# Patient Record
Sex: MALE | Race: BLACK OR AFRICAN AMERICAN | Employment: OTHER | URBAN - METROPOLITAN AREA
[De-identification: names, ages, dates, MRNs, and addresses within clinical notes are randomized per-mention and may not be internally consistent; named-entity substitution may affect disease eponyms.]

---

## 2023-01-02 ENCOUNTER — APPOINTMENT (OUTPATIENT)
Dept: GENERAL RADIOLOGY | Age: 68
End: 2023-01-02
Payer: MEDICARE

## 2023-01-02 ENCOUNTER — HOSPITAL ENCOUNTER (EMERGENCY)
Age: 68
Discharge: HOME | End: 2023-01-02
Attending: EMERGENCY MEDICINE
Payer: MEDICARE

## 2023-01-02 VITALS
SYSTOLIC BLOOD PRESSURE: 146 MMHG | HEART RATE: 87 BPM | RESPIRATION RATE: 16 BRPM | DIASTOLIC BLOOD PRESSURE: 78 MMHG | OXYGEN SATURATION: 97 %

## 2023-01-02 DIAGNOSIS — E11.621 DIABETIC ULCER OF RIGHT FOOT ASSOCIATED WITH TYPE 2 DIABETES MELLITUS, LIMITED TO BREAKDOWN OF SKIN, UNSPECIFIED PART OF FOOT (HCC): Primary | ICD-10-CM

## 2023-01-02 DIAGNOSIS — L97.511 DIABETIC ULCER OF RIGHT FOOT ASSOCIATED WITH TYPE 2 DIABETES MELLITUS, LIMITED TO BREAKDOWN OF SKIN, UNSPECIFIED PART OF FOOT (HCC): Primary | ICD-10-CM

## 2023-01-02 PROCEDURE — 73630 X-RAY EXAM OF FOOT: CPT

## 2023-01-02 PROCEDURE — 99283 EMERGENCY DEPT VISIT LOW MDM: CPT

## 2023-01-02 RX ORDER — DOXYCYCLINE HYCLATE 100 MG
100 TABLET ORAL 2 TIMES DAILY
Qty: 14 TABLET | Refills: 0 | Status: SHIPPED | OUTPATIENT
Start: 2023-01-02 | End: 2023-01-09

## 2023-01-02 RX ORDER — CEPHALEXIN 500 MG/1
500 CAPSULE ORAL 4 TIMES DAILY
Qty: 28 CAPSULE | Refills: 0 | Status: SHIPPED | OUTPATIENT
Start: 2023-01-02 | End: 2023-01-09

## 2023-01-02 ASSESSMENT — PAIN DESCRIPTION - ORIENTATION: ORIENTATION: RIGHT

## 2023-01-02 ASSESSMENT — PAIN DESCRIPTION - LOCATION: LOCATION: FOOT

## 2023-01-02 ASSESSMENT — PAIN DESCRIPTION - DESCRIPTORS: DESCRIPTORS: ACHING

## 2023-01-02 ASSESSMENT — PAIN SCALES - GENERAL: PAINLEVEL_OUTOF10: 7

## 2023-01-02 ASSESSMENT — PAIN DESCRIPTION - PAIN TYPE: TYPE: ACUTE PAIN

## 2023-01-02 NOTE — ED PROVIDER NOTES
[address] [address] ENCOUNTER        Patient Name: Ara Campoverde  MRN: [id number]  [place] 1955  Date of evaluation: 1/2/2023  Provider: Klaus Ingram MD  PCP: No primary care provider on file. Note Started: 10:57 AM EST 1/2/23    CHIEF COMPLAINT       Foot Pain      HISTORY OF PRESENT ILLNESS: 1 or more Elements     History from : Patient    Limitations to history : None    Ara Campoverde is a 75 y.o. male who presents for evaluation of right foot pain. He has history of type 2 diabetes. He states that he noted pain to the right foot several days ago. He only noticed the development of a ulcer at the base of the first metatarsal yesterday. He is noting some pain in the arch of the foot. Denies any fevers nausea or vomiting. Denies any redness that is going up the foot or the leg. He has not recently been on antibiotics. Nursing Notes were all reviewed and agreed with or any disagreements were addressed in the HPI. REVIEW OF SYSTEMS :      Review of Systems    Positives and Pertinent negatives as per HPI. SURGICAL HISTORY   No past surgical history on file. CURRENTMEDICATIONS       Previous Medications    No medications on file       ALLERGIES     Patient has no known allergies. FAMILYHISTORY     No family history on file. SOCIAL HISTORY       Social History     Vaping Use    Vaping Use: Never used   Substance Use Topics    Alcohol use: Yes     Comment: social    Drug use: Yes     Types: Marijuana (Weed)     Comment: weekly       SCREENINGS        [place] Coma Scale  Eye Opening: Spontaneous  Best Verbal Response: Oriented  Best Motor Response: Obeys commands  [place] Coma Scale Score: 15                CIWA Assessment  BP: (!) 146/78  Heart Rate: 87           PHYSICAL EXAM  1 or more Elements     ED Triage Vitals   BP Temp Temp src Pulse Resp SpO2 Height Weight   -- -- -- -- -- -- -- --       General: No acute distress.  Alert and Oriented. Appears stated age. HEENT: No midline cervical spine tenderness. Full ROM of the neck. Cardiac: Regular rate and rhythm. Radial pulses are intact bilaterally. Chest: No respiratory distress. No increased work of breathing. No use of accessory muscles for respiration. Extremities:No significant lower extremity edema. Lower extremities are symmetric. There is diabetic foot wound to the base of the first metatarsal, there is skin breakdown. No bone exposure. There is no purulent drainage. There is very mild erythema surrounding the ulceration. There is no crepitance. There is no erythema of the dorsum of the foot or the leg. Neuro: Moving all extremities. No focal deficits. Speech is clear. Skin:No rash, no erythema  Psych: Calm and cooperative. DIAGNOSTIC RESULTS   LABS:    Labs Reviewed - No data to display    When ordered only abnormal lab results are displayed. All other labs were within normal range or not returned as of this dictation. RADIOLOGY:   Non-plain film images such as CT, Ultrasound and MRI are read by the radiologist. Plain radiographic images are visualized and preliminarily interpreted by the ED Provider with the below findings: There is no subcutaneous air or obvious fracture    Interpretation per the Radiologist below, if available at the time of this note:    XR FOOT RIGHT (MIN 3 VIEWS)   Final Result   Impression:   1. No acute fracture or dislocation. 2.  No osseous erosions are identified. 3.  Degenerative changes are present in the first metatarsophalangeal joint. No results found. Bedside Ultrasound, as interpreted by me, if performed:    No results found.     PROCEDURES     Unless otherwise noted below, none     Procedures    CRITICAL CARE TIME     I personally spent a total of 0 minutes of critical care time in obtaining history, performing a physical exam, bedside monitoring of interventions, collecting and interpreting tests and discussion with consultants but excluding time spent performing procedures, treating other patients and teaching time. PAST MEDICAL HISTORY      has a past medical history of Diabetes mellitus ([address] Hyperlipidemia, and Hypertension. EMERGENCY DEPARTMENT COURSE and DIFFERENTIAL DIAGNOSIS/MDM:     Vitals:    Vitals:    01/02/23 1106   BP: (!) 146/78   Pulse: 87   Resp: 16   SpO2: 97%       Patient was treated with and given the following medications:  Medications - No data to display          Is this patient to be included in the SEP-1 Core Measure due to severe sepsis or septic shock? No   Exclusion criteria - the patient is NOT to be included for SEP-1 Core Measure due to:  2+ SIRS criteria are not met    CC/HPI Summary, DDx, ED Course, and Reassessment:     77-year-old male with history of type 2 diabetes presenting for pain in the right foot, found to have diabetic foot ulcer, there is mild cellulitic change around this however not extending to the foot or the leg, he has stable vital signs we will obtain x-ray of the foot to evaluate for potential osteomyelitis. The differential diagnosis associated with the patient's presentation includes: Osteomyelitis, cellulitis, diabetic foot infection, tendinitis    CONSULTS: (Who and What was discussed)  None    Discussion with Other Professionals : None      Social Determinants : None    Patient's care impacted by chronic condition(s): Diabetes, chronic heart failure, hypertension    Records Reviewed :  Outpatient Notes family medicine office visit on 10/25/2022    Family medicine note revealed management of diabetes    Disposition Considerations (include 1 Tests not done, Shared Decision Making, Pt Expectation of Test or Tx.):     I considered performing laboratory evaluation but did not after shared decision making with the patient as he has had stable vital signs, is afebrile, appears systemically well, no signs of osteomyelitis on x-ray of the foot        Appropriate for outpatient management due to nontoxic appearance, stable vital signs, no findings of osteomyelitis on x-ray of the foot, he also has established follow-up with podiatry that he will call today for an appointment    Escalation of care including admission/observation considered due to diabetic foot infection, but patient was able to be discharged due to good follow-up plan, stable vital signs. Shared decision making with patient was employed. He was advised that if he does develop fever, worsening redness of the foot or leg or develop any other concerning symptoms to come back for IV antibiotics, suspected admission. The patient will be discharged from the emergency department. The patient was counseled on their diagnosis and any medications prescribed. They were advised on the need for PCP followup. They were counseled on the need to return to the emergency department if any of their symptoms were to worsen, change or have any other concerns. Discharged in stable condition. I am the Primary Clinician of Record. FINAL IMPRESSION      1. Diabetic ulcer of right foot associated with type 2 diabetes mellitus, limited to breakdown of skin, unspecified part of foot [facility])          DISPOSITION/PLAN     DISPOSITION Decision To Discharge 01/02/2023 12:05:05 PM      PATIENT REFERRED TO:  [address] Emergency Department  [address] [place] [address] [id number] [place] [address]  [address]  [phone]    Schedule an appointment as soon as possible for a visit       DISCHARGE MEDICATIONS:  Patient was given scripts for the following medications.  I counseled patient how to take these medications:  New Prescriptions    CEPHALEXIN (KEFLEX) 500 MG CAPSULE    Take 1 capsule by mouth 4 times daily for 7 days    DOXYCYCLINE HYCLATE (VIBRA-TABS) 100 MG TABLET    Take 1 tablet by mouth 2 times daily for 7 days       DISCONTINUED MEDICATIONS:  Discontinued Medications    No medications on file              (This chart was generated in part by using Dragon Dictation system and may contain errors related to that system including errors in grammar, punctuation, and spelling, as well as words and phrases that may be inappropriate.  If there are any questions or concerns please feel free to contact the dictating provider for clarification.)    MD Adriana Christensen MD  01/02/23 8081

## 2023-01-02 NOTE — DISCHARGE INSTRUCTIONS
You were x-ray did not show signs of bone infection. You likely have a diabetic foot ulcer. You have been started on antibiotics. Please take the antibiotics as directed, call your podiatrist today to schedule appointment for a visit as soon as possible.   If you develop any fever, nausea, vomiting, increased redness or drainage, please come back to the emergency department as you would need IV antibiotics and admitted to the hospital.

## 2023-01-02 NOTE — ED NOTES
Patient given d/c instructions with return verbalization including Rx. Emphasis on f/u and om completion of antibiotics. Reviewed wound care, dressing change and s/s of infection. Pt aware that he can loose foot if does not get appropriate f/u. To speak to PCP about diabetic ed classes. To call Dr in AM. To return with worsening s/s. Declined use of WC, Ambulated to lobby with steady gait.      Carlene Humphreys RN  01/02/23 0604

## 2023-01-06 ENCOUNTER — OFFICE VISIT (OUTPATIENT)
Dept: INTERNAL MEDICINE CLINIC | Age: 68
End: 2023-01-06
Payer: MEDICARE

## 2023-01-06 VITALS — TEMPERATURE: 98 F

## 2023-01-06 DIAGNOSIS — B35.1 ONYCHOMYCOSIS: ICD-10-CM

## 2023-01-06 DIAGNOSIS — L97.512 FOOT ULCERATION, RIGHT, WITH FAT LAYER EXPOSED (HCC): Primary | ICD-10-CM

## 2023-01-06 DIAGNOSIS — Z86.39 HISTORY OF UNCONTROLLED DIABETES: ICD-10-CM

## 2023-01-06 DIAGNOSIS — L97.521 FOOT ULCERATION, LEFT, LIMITED TO BREAKDOWN OF SKIN (HCC): ICD-10-CM

## 2023-01-06 PROCEDURE — 11042 DBRDMT SUBQ TIS 1ST 20SQCM/<: CPT

## 2023-01-06 PROCEDURE — 11721 DEBRIDE NAIL 6 OR MORE: CPT

## 2023-01-06 PROCEDURE — 99213 OFFICE O/P EST LOW 20 MIN: CPT

## 2023-01-09 ENCOUNTER — TELEPHONE (OUTPATIENT)
Dept: INTERNAL MEDICINE CLINIC | Age: 68
End: 2023-01-09

## 2023-01-09 NOTE — TELEPHONE ENCOUNTER
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 Dr. Stephen Lopez states she will see him again on Friday . Depending on what the wounds look like at that time , she will decide when he can return to work . Patient notified . Agrees's with the  plan .

## 2023-01-09 NOTE — TELEPHONE ENCOUNTER
PT STATED HE NEED A DR'S NOTE STATING HE HAD [facility] AND WHEN HE CAN START WORKING. PT WANTS IT FAXED [phone].  PT CAN BE REACHED [phone]

## 2023-01-09 NOTE — PROGRESS NOTES
Department of Podiatry  Resident Progress Note    Harmon Bias  Allergies: Patient has no known allergies. SUBJECTIVE  The patient is a 75 y.o. male who presents with chief complaint of right lower extremity ulceration. Patient recently presented to the [facility] emergency department on 1/2/2023. At this time x-rays were performed and determination that there is no osteomyelitis it was felt states that the patient could follow-up as an outpatient at the [facility] podiatric clinic. Patient relates that he has not had ulcerations in the past and that he only just noticed this wound. Patient denies any drainage from the ulceration but did notice occasional bleeding in his socks which prompted him to look at the bottom of his feet. Patient relates that he does have loss of sensation to his feet including numbness, tingling, burning. Patient states that he does not regularly check his blood sugar and they his most recent hemoglobin A1c was 13. Patient denies taking medication for diabetes. Patient denies any nausea, vomiting, fever, chills. Patient also relates elongated difficult to cut toenails but denies other pedal complaints at this time. Past Medical History:        Diagnosis Date    Diabetes mellitus ([facility] [address]     Hyperlipidemia     Hypertension        REVIEW OF SYSTEMS:  CONSTITUTIONAL:  negative    OBJECTIVE  Patient presents ambulating in athletic shoe gear with a female . Patient is unassisted, ANO x3 and in no acute distress. VASCULAR: DP and PT pulses are faintly palpable 1/4 b/l. CFT is brisk to the digits of the foot b/l. Skin temperature is warm to cool from proximal to distal with no focal calor noted. No edema noted. No pain with calf compression b/l. NEUROLOGIC: Gross and epicritic sensation is absent b/l. Protective absent is appreciated at all pedal sites b/l. DERMATOLOGIC: Nails 1-5 b/l are elongated and thickened.  Webspaces 1-4 b/l are clean, dry, and intact. No subcutaneous nodules, rashes, or other skin lesions noted. Right lower extremity:  Full-thickness ulceration noted to the subfirst metatarsal head right lower extremity. The wound base is a mix of fibrotic and granular tissue. The periwound is macerated. The wound does undermine prior to debridement however no probe to bone, tunneling, tracking present. There is no fluctuance, crepitus or purulence noted. There is slight malodor appreciated. Left lower extremity:  Predebridement hyperkeratotic tissue noted subfirst metatarsal head. Following debridement of hyperkeratotic tissue underlying partial-thickness ulceration noted with serous fluid present within the lesion. Malodor noted however no crepitus, fluctuance. The wound does not tunnel, track, probe to bone. MUSCULOSKELETAL: Muscle strength is 5/5 for all pedal groups tested. No pain with palpation of the foot or ankle b/l. Ankle joint ROM is decreased in dorsiflexion with the knee extended. HAV deformity noted right lower extremity. No previous amputations appreciated. IMAGING  Narrative   Exam: XR FOOT RIGHT (MIN 3 VIEWS)       History: diabetic ulcer, base of 1st metatarsal, eval for osteo       Comparison: None       Findings:   Bones: There is no acute fracture or dislocation. No osseous erosion or destruction is seen. Joint spaces: The joint spaces are well maintained. A calcaneal spur is present. Degenerative changes are present the first metatarsophalangeal joint. Soft tissues: There is no soft tissue swelling. No radiopaque foreign body is seen. Impression   Impression:   1. No acute fracture or dislocation. 2.  No osseous erosions are identified. 3.  Degenerative changes are present in the first metatarsophalangeal joint. ASSESSMENT  1. Full-thickness ulceration subfirst met head right lower extremity ([place] 2)  2.   Partial-thickness ulceration subfirst metatarsal head left lower extremity ([place] 1)  3. Uncontrolled diabetes mellitus type 2 with peripheral neuropathy  4. Onychomycosis nails 1 through 5 B/L    PLAN  -Evaluation and management x 15 minutes and greater than 50% of the time spent explaining the etiology and treatment with the patient. --Patient educated about the importance of diabetic foot care consisting of but not limited to daily foot inspections, wearing supportive shoes and inserts at all times, applying lotion to feet while sparing webspaces, and routinely checking blood sugar.   -Verbal consent was obtained prior to debridement. Utilizing a #15 blade the ulceration was excisionally divided down to and including subcutaneous tissue on the right and down to and including dermal tissue on the left lower extremity. Approximately 1 cc of bleeding appreciated. Hemostasis was achieved with direct compression.  -Verbal consent obtained prior to debridement of nails. Utilizing sterile nail nippers nails 1 through 5 bilaterally were debrided in thickness and length. Patient tolerated procedure well without incident.  -Patient to continue with outpatient antibiotics as prescribed by the emergency department through course completion.  -Bilateral lower extremities were dressed with Betadine, gauze, Kerlix, Ace  -A walking boot was dispensed to the patient right lower extremity and a surgical shoe was dispensed to be worn to the patient's left lower extremity. Patient was instructed to wear both of these assistive devices at all times to offload the bilateral lower extremity.  -Instructed patient to watch for signs and symptoms of infection including but not limited to fever, chills, feeling ill, redness around the wounds, redness streaking up the legs, purulent drainage.  Instructed patient to call the clinic or present to the nearest emergency department if they notice these signs or symptoms  -Surgical options were briefly discussed with the patient as the patient's foot structure consisting of HAV is likely contributing to the excessive force underneath the subfirst metatarsal head and subsequent soft tissue destruction. However related to the patient that with an A1c of 13 that surgery would be an appropriate option at this time due to decreased wound healing and increased surgical complications. Related to patient that he needs to work on glucose control and establish care with his primary care physician immediately for discussion on diabetic medication and management.  -Referral sent to home health care to assist with wound care dressings. Patient was instructed to leave the dressing clean, dry, intact until home health care can be established or next appointment, which ever is to come first.    -Patient to return to clinic in 1 week for further wound evaluation bilateral lower extremity.     Patient assessment and plan discussed with Dr. Resendiz Number, [phone] Breanne Chen DPM  01/08/23  8:25 PM

## 2023-01-13 ENCOUNTER — OFFICE VISIT (OUTPATIENT)
Dept: INTERNAL MEDICINE CLINIC | Age: 68
End: 2023-01-13
Payer: MEDICARE

## 2023-01-13 VITALS — TEMPERATURE: 97 F

## 2023-01-13 DIAGNOSIS — L97.512 FOOT ULCERATION, RIGHT, WITH FAT LAYER EXPOSED (HCC): ICD-10-CM

## 2023-01-13 DIAGNOSIS — L97.522 FOOT ULCERATION, LEFT, WITH FAT LAYER EXPOSED (HCC): ICD-10-CM

## 2023-01-13 DIAGNOSIS — Z86.39 HISTORY OF UNCONTROLLED DIABETES: Primary | ICD-10-CM

## 2023-01-13 PROCEDURE — 99213 OFFICE O/P EST LOW 20 MIN: CPT | Performed by: STUDENT IN AN ORGANIZED HEALTH CARE EDUCATION/TRAINING PROGRAM

## 2023-01-15 NOTE — PROGRESS NOTES
Department of Podiatry  Resident Progress Note    Tonya Tavares  Allergies: Patient has no known allergies. SUBJECTIVE  The patient is a 75 y.o. male who presents today for a wound check. Patient says that home health care was able to get set up. Patient says that they came for the initial visit, and are supposed to start the dressing changes next week. Patient states that he has noticed some more drainage from the ulceration on his right foot but denies any increase in pain. Patient additionally states that he did not check his blood sugar today. Patient denies any other pedal complaints. Patient denies any N/V/F/C/SoB. Past Medical History:        Diagnosis Date    Diabetes mellitus ([facility] [address]     Hyperlipidemia     Hypertension        REVIEW OF SYSTEMS:    Review of Systems: Pertinent positive and negative findings as documented in the HPI, otherwise all other systems were reviewed and were negative. OBJECTIVE  Patient presents to clinic today unassisted and ambulating in athletic shoe gear. Patient is AAOx3 and NAD. VASCULAR: DP and PT pulses are faintly palpable +1/4 b/l. CFT is brisk to the digits of the foot b/l. Skin temperature is warm to cool from proximal to distal with no focal calor noted. No edema or erythema noted. No pain with calf compression b/l. NEUROLOGIC: Gross and epicritic sensation is absent b/l. Protective sensation is absent at all pedal sites b/l. DERMATOLOGIC: Chronic dermatologic changes noted to b/l LE. Nails 1-5 b/l are WNL for thickness and length. Webspaces 1-4 b/l are clean, dry, and intact. Right lower extremity has a full-thickness ulceration subfirst metatarsal head that measures 1.0 cm x 0.7 cm x 0.3 cm with a fibrogranular wound bed and an extensive hyperkeratotic periwound. No purulence, crepitus or fluctuance noted. Upon debridement of the hyperkeratotic periwound revealed a macerated dermal tissue layer with slight malodor noted.     Patient provided verbal consent for pictures obtained today:          Left lower extremity has hyperkeratosis noted sub 1st and 5th metatarsal head. Upon debridement of the hyperkeratotic tissue revealed a full-thickness ulceration sub 1st metatarsal head and a healthy dermal tissue layer under the 5th metatarsal head. The full thickness ulceration sub 1st metatarsal head measured 0.5 cm x 0.3 cm x 0.2 cm with a fibrogranular wound bed and a macerated dermal periwound region. Furthermore there was no purulence, crepitus, fluctuance, or malodor noted. Patient provided verbal consent for pictures obtained today:         MUSCULOSKELETAL: Muscle strength is 5/5 for all pedal groups tested. No pain with palpation of the foot or ankle b/l. Ankle joint ROM is decreased in dorsiflexion with the knee extended. 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 IMAGING    XR Foot, right (1/2/23)  Narrative   Exam: XR FOOT RIGHT (MIN 3 VIEWS)       History: diabetic ulcer, base of 1st metatarsal, eval for osteo       Comparison: None       Findings:   Bones: There is no acute fracture or dislocation. No osseous erosion or destruction is seen. Joint spaces: The joint spaces are well maintained. A calcaneal spur is present. Degenerative changes are present the first metatarsophalangeal joint. Soft tissues: There is no soft tissue swelling. No radiopaque foreign body is seen. Impression   Impression:   1. No acute fracture or dislocation. 2.  No osseous erosions are identified. 3.  Degenerative changes are present in the first metatarsophalangeal joint.        ASSESSMENT   Full-thickness ulceration subfirst met head, right LE ([place] 2)  Full-thickness ulceration subfirst metatarsal head, left LE ([place] 2)  Corns and callosities  Uncontrolled diabetes mellitus type 2 with peripheral neuropathy    PLAN  -Evaluation and management x 15 minutes and greater than 50% of the time spent explaining the etiology and treatment with the patient.   -Once verbal consent was obtained, a #15 blade was used for excisional debridement of ulcerations. The sub 1st metatarsal head ulcerations b/l were debrided down to and including subcutaneous tissues. Less than 1cc of bleeding noted. Hemostasis was achieved with direct compression. Patient tolerated procedure well.   -Once verbal consent was obtained, a #15 blade was used for excisional debridement of hyperkeratosis sub 5th metatarsal head on left LE down to and including dermal tissue. Patient tolerated procedure well. -Dressings were applied to b/l LE consisting of betadine soaked gauze, dry gauze, Kerlix, and ACE. -Patient instructed to wear walking boot and surgical shoe dispensed at previous visit.   -Patient not a great surgical candidate at this time due to uncontrolled type II diabetes. -Patient given work note for today's and last [address] appointment.   -Patient was instructed to leave dressing clean, dry, and intact until first home health care dressing change next week. -Instructed patient to watch for signs and symptoms of infection including but not limited to fever, chills, feeling ill, redness around the wounds, redness streaking up the legs, purulent drainage. Instructed patient to call the clinic or present to the nearest emergency department if they notice these signs or symptoms   -Patient educated about the importance of diabetic foot care consisting of but not limited to daily foot inspections, wearing supportive shoes and inserts at all times, applying lotion to feet while sparing webspaces, and routinely checking blood sugar.    -RTC 1 week    Patient assessment and plan discussed with SHEILA Boucher DPM  Podiatric Resident, PGY-2  Pager #: [phone] or Perfect Serve

## 2023-01-20 ENCOUNTER — TELEPHONE (OUTPATIENT)
Dept: INTERNAL MEDICINE CLINIC | Age: 68
End: 2023-01-20

## 2023-01-20 ENCOUNTER — OFFICE VISIT (OUTPATIENT)
Dept: INTERNAL MEDICINE CLINIC | Age: 68
End: 2023-01-20
Payer: MEDICARE

## 2023-01-20 VITALS — TEMPERATURE: 97 F

## 2023-01-20 DIAGNOSIS — Z86.39 HISTORY OF UNCONTROLLED DIABETES: Primary | ICD-10-CM

## 2023-01-20 DIAGNOSIS — L97.512 FOOT ULCERATION, RIGHT, WITH FAT LAYER EXPOSED (HCC): ICD-10-CM

## 2023-01-20 DIAGNOSIS — L97.522 FOOT ULCERATION, LEFT, WITH FAT LAYER EXPOSED (HCC): ICD-10-CM

## 2023-01-20 PROCEDURE — 99213 OFFICE O/P EST LOW 20 MIN: CPT

## 2023-01-22 NOTE — PROGRESS NOTES
Department of Podiatry  Resident Progress Note    Humberto Javiersiva  Allergies: Patient has no known allergies. SUBJECTIVE  The patient is a 75 y.o. male who presents today for a wound check. Patient states that home health care has came to change his dressings twice last week. Patient states that the drainage has decreased since last visit. Patient denies any pain to b/l wound. Patient endorses being diabetic but states that he has not checked his blood sugar today. Patient denies any other pedal complaints. Patient denies any N/V/F/C/SoB. Past Medical History:        Diagnosis Date    Diabetes mellitus ([facility] [address]     Hyperlipidemia     Hypertension        REVIEW OF SYSTEMS:    Review of Systems: Pertinent positive and negative findings as documented in the HPI, otherwise all other systems were reviewed and were negative. OBJECTIVE  Patient presents to clinic today unassisted and ambulating in b/l surgical shoe. Patient is AAOx3 and NAD. VASCULAR: DP and PT pulses are faintly palpable +1/4 b/l. CFT is brisk to the digits of the foot b/l. Skin temperature is warm to cool from proximal to distal with no focal calor noted. No edema or erythema noted. No pain with calf compression b/l. NEUROLOGIC: Gross and epicritic sensation is absent b/l. Protective sensation is absent at all pedal sites b/l. DERMATOLOGIC: Chronic dermatologic changes noted to b/l LE. Nails 1-5 b/l are WNL for thickness and length. Webspaces 1-4 b/l are clean, dry, and intact. Right lower extremity has a full-thickness ulceration subfirst metatarsal head. The wound bed is noted to be a mix of fibrotic and granular tissue with extensive hyperkeratotic ashley-wound. No purulence, crepitus or fluctuance noted. Slight malodor noted. Patient provided verbal consent for pictures obtained today:            Left lower extremity has hyperkeratosis noted sub 1st and 5th metatarsal head.  Upon debridement of the hyperkeratotic tissue revealed a full-thickness ulceration sub 1st metatarsal head and a healthy dermal tissue layer under the 5th metatarsal head. The full thickness ulceration sub 1st metatarsal head. The wound bed is noted to be a mix of fibrotic and granular tissue with hyperkeratotic ashley-wound. No fluctuance, crepitus, purulence noted. Slight malodor noted. Patient provided verbal consent for pictures obtained today:           MUSCULOSKELETAL: Muscle strength is 5/5 for all pedal groups tested. No pain with palpation of the foot or ankle b/l. Ankle joint ROM is decreased in dorsiflexion with the knee extended. 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 IMAGING    XR Foot, right (1/2/23)  Narrative   Exam: XR FOOT RIGHT (MIN 3 VIEWS)       History: diabetic ulcer, base of 1st metatarsal, eval for osteo       Comparison: None       Findings:   Bones: There is no acute fracture or dislocation. No osseous erosion or destruction is seen. Joint spaces: The joint spaces are well maintained. A calcaneal spur is present. Degenerative changes are present the first metatarsophalangeal joint. Soft tissues: There is no soft tissue swelling. No radiopaque foreign body is seen. Impression   Impression:   1. No acute fracture or dislocation. 2.  No osseous erosions are identified. 3.  Degenerative changes are present in the first metatarsophalangeal joint. ASSESSMENT   Full-thickness ulceration subfirst met head, right LE (Ruelas 2)  Full-thickness ulceration subfirst metatarsal head, left LE (Ruelas 2)  Corns and callosities  Uncontrolled diabetes mellitus type 2 with peripheral neuropathy    PLAN  -Evaluation and management x 15 minutes and greater than 50% of the time spent explaining the etiology and treatment with the patient.   -Once verbal consent was obtained, a #15 blade was used for excisional debridement of ulcerations.  The sub 1st metatarsal head ulcerations b/l were debrided down to and including subcutaneous tissues. Less than 2cc of bleeding noted. Hemostasis was achieved with direct compression. Patient tolerated procedure well.   -Once verbal consent was obtained, a #15 blade was used for excisional debridement of hyperkeratosis sub 5th metatarsal head on left LE down to and including dermal tissue. Patient tolerated procedure well. -Dressings were applied to b/l LE consisting of saline-soaked gauze, dry gauze, Kerlix, and ACE. -Patient given work note for today's appointment.   -Instructed patient to watch for signs and symptoms of infection including but not limited to fever, chills, feeling ill, redness around the wounds, redness streaking up the legs, purulent drainage. Instructed patient to call the clinic or present to the nearest emergency department if they notice these signs or symptoms   -Patient educated about the importance of diabetic foot care consisting of but not limited to daily foot inspections, wearing supportive shoes and inserts at all times, applying lotion to feet while sparing webspaces, and routinely checking blood sugar.      -RTC 2 weeks    Patient assessment and plan discussed with SHEILA Nascimento DPM  Podiatric Resident PGY-1  Pager [phone] or Perfect Serve

## 2023-09-11 ENCOUNTER — OFFICE VISIT (OUTPATIENT)
Dept: PRIMARY CARE CLINIC | Age: 68
End: 2023-09-11
Payer: MEDICARE

## 2023-09-11 VITALS
OXYGEN SATURATION: 94 % | SYSTOLIC BLOOD PRESSURE: 137 MMHG | TEMPERATURE: 98.4 F | HEIGHT: 72 IN | DIASTOLIC BLOOD PRESSURE: 86 MMHG | WEIGHT: 222.6 LBS | BODY MASS INDEX: 30.15 KG/M2 | HEART RATE: 98 BPM

## 2023-09-11 DIAGNOSIS — E78.00 PURE HYPERCHOLESTEROLEMIA: ICD-10-CM

## 2023-09-11 DIAGNOSIS — Z12.5 SCREENING FOR PROSTATE CANCER: ICD-10-CM

## 2023-09-11 DIAGNOSIS — I10 ESSENTIAL HYPERTENSION: ICD-10-CM

## 2023-09-11 DIAGNOSIS — E11.51 TYPE 2 DIABETES MELLITUS WITH DIABETIC PERIPHERAL ANGIOPATHY WITHOUT GANGRENE, WITH LONG-TERM CURRENT USE OF INSULIN (HCC): Primary | ICD-10-CM

## 2023-09-11 DIAGNOSIS — Z86.010 HISTORY OF COLON POLYPS: ICD-10-CM

## 2023-09-11 DIAGNOSIS — H40.1134 PRIMARY OPEN ANGLE GLAUCOMA (POAG) OF BOTH EYES, INDETERMINATE STAGE: ICD-10-CM

## 2023-09-11 DIAGNOSIS — Z79.4 TYPE 2 DIABETES MELLITUS WITH DIABETIC PERIPHERAL ANGIOPATHY WITHOUT GANGRENE, WITH LONG-TERM CURRENT USE OF INSULIN (HCC): Primary | ICD-10-CM

## 2023-09-11 DIAGNOSIS — Z23 FLU VACCINE NEED: ICD-10-CM

## 2023-09-11 DIAGNOSIS — N18.31 STAGE 3A CHRONIC KIDNEY DISEASE (HCC): ICD-10-CM

## 2023-09-11 PROBLEM — E11.9 DIABETES MELLITUS WITHOUT COMPLICATION (HCC): Status: ACTIVE | Noted: 2017-03-27

## 2023-09-11 PROCEDURE — 2022F DILAT RTA XM EVC RTNOPTHY: CPT | Performed by: FAMILY MEDICINE

## 2023-09-11 PROCEDURE — 3078F DIAST BP <80 MM HG: CPT | Performed by: FAMILY MEDICINE

## 2023-09-11 PROCEDURE — G8417 CALC BMI ABV UP PARAM F/U: HCPCS | Performed by: FAMILY MEDICINE

## 2023-09-11 PROCEDURE — 3074F SYST BP LT 130 MM HG: CPT | Performed by: FAMILY MEDICINE

## 2023-09-11 PROCEDURE — 3017F COLORECTAL CA SCREEN DOC REV: CPT | Performed by: FAMILY MEDICINE

## 2023-09-11 PROCEDURE — 90694 VACC AIIV4 NO PRSRV 0.5ML IM: CPT | Performed by: FAMILY MEDICINE

## 2023-09-11 PROCEDURE — G0008 ADMIN INFLUENZA VIRUS VAC: HCPCS | Performed by: FAMILY MEDICINE

## 2023-09-11 PROCEDURE — 1123F ACP DISCUSS/DSCN MKR DOCD: CPT | Performed by: FAMILY MEDICINE

## 2023-09-11 PROCEDURE — 99204 OFFICE O/P NEW MOD 45 MIN: CPT | Performed by: FAMILY MEDICINE

## 2023-09-11 PROCEDURE — 3046F HEMOGLOBIN A1C LEVEL >9.0%: CPT | Performed by: FAMILY MEDICINE

## 2023-09-11 PROCEDURE — 1036F TOBACCO NON-USER: CPT | Performed by: FAMILY MEDICINE

## 2023-09-11 PROCEDURE — G8427 DOCREV CUR MEDS BY ELIG CLIN: HCPCS | Performed by: FAMILY MEDICINE

## 2023-09-11 ASSESSMENT — PATIENT HEALTH QUESTIONNAIRE - PHQ9
1. LITTLE INTEREST OR PLEASURE IN DOING THINGS: 0
2. FEELING DOWN, DEPRESSED OR HOPELESS: 0
SUM OF ALL RESPONSES TO PHQ9 QUESTIONS 1 & 2: 0
SUM OF ALL RESPONSES TO PHQ QUESTIONS 1-9: 0

## 2023-09-12 RX ORDER — ASPIRIN 81 MG/1
81 TABLET ORAL DAILY
COMMUNITY
Start: 2012-12-05 | End: 2023-09-13 | Stop reason: SDUPTHER

## 2023-09-12 RX ORDER — INSULIN DEGLUDEC 200 U/ML
70 INJECTION, SOLUTION SUBCUTANEOUS NIGHTLY
COMMUNITY
Start: 2021-10-19 | End: 2023-09-13 | Stop reason: SDUPTHER

## 2023-09-12 RX ORDER — NIFEDIPINE 90 MG/1
90 TABLET, FILM COATED, EXTENDED RELEASE ORAL DAILY
COMMUNITY
Start: 2023-07-20 | End: 2023-09-13 | Stop reason: SDUPTHER

## 2023-09-12 RX ORDER — ATORVASTATIN CALCIUM 80 MG/1
80 TABLET, FILM COATED ORAL DAILY
COMMUNITY
End: 2023-09-13 | Stop reason: SDUPTHER

## 2023-09-12 RX ORDER — LISINOPRIL 40 MG/1
40 TABLET ORAL DAILY
COMMUNITY
Start: 2022-06-17 | End: 2023-09-13 | Stop reason: SDUPTHER

## 2023-09-12 NOTE — PROGRESS NOTES
Maxzide 75-50 was last filled in march
PSA.  - PSA Screening; Future      Return if symptoms worsen or fail to improve. Hakeem Elliott, DO     Please note that this chart was generated using dragon dictation software. Although every effort was made to ensure the accuracy of this automated transcription, some errors in transcription may have occurred.

## 2023-09-13 PROBLEM — E11.51 TYPE 2 DIABETES MELLITUS WITH DIABETIC PERIPHERAL ANGIOPATHY WITHOUT GANGRENE, WITH LONG-TERM CURRENT USE OF INSULIN (HCC): Status: ACTIVE | Noted: 2017-03-27

## 2023-09-13 PROBLEM — Z86.010 HISTORY OF COLON POLYPS: Status: ACTIVE | Noted: 2023-09-13

## 2023-09-13 PROBLEM — Z79.4 TYPE 2 DIABETES MELLITUS WITH DIABETIC PERIPHERAL ANGIOPATHY WITHOUT GANGRENE, WITH LONG-TERM CURRENT USE OF INSULIN (HCC): Status: ACTIVE | Noted: 2017-03-27

## 2023-09-13 RX ORDER — INSULIN DEGLUDEC 200 U/ML
70 INJECTION, SOLUTION SUBCUTANEOUS NIGHTLY
Qty: 4 ADJUSTABLE DOSE PRE-FILLED PEN SYRINGE | Refills: 2 | Status: SHIPPED | OUTPATIENT
Start: 2023-09-13

## 2023-09-13 RX ORDER — ASPIRIN 81 MG/1
81 TABLET ORAL DAILY
Qty: 30 TABLET | Refills: 2 | Status: SHIPPED | OUTPATIENT
Start: 2023-09-13

## 2023-09-13 RX ORDER — LISINOPRIL 40 MG/1
40 TABLET ORAL DAILY
Qty: 30 TABLET | Refills: 2 | Status: SHIPPED | OUTPATIENT
Start: 2023-09-13

## 2023-09-13 RX ORDER — ATORVASTATIN CALCIUM 80 MG/1
80 TABLET, FILM COATED ORAL DAILY
Qty: 30 TABLET | Refills: 2 | Status: SHIPPED | OUTPATIENT
Start: 2023-09-13

## 2023-09-13 RX ORDER — NIFEDIPINE 90 MG/1
90 TABLET, FILM COATED, EXTENDED RELEASE ORAL DAILY
Qty: 30 TABLET | Refills: 2 | Status: SHIPPED | OUTPATIENT
Start: 2023-09-13

## 2023-09-13 ASSESSMENT — ENCOUNTER SYMPTOMS
ABDOMINAL PAIN: 0
SORE THROAT: 0
COUGH: 0
NAUSEA: 0
SHORTNESS OF BREATH: 0

## 2023-09-19 DIAGNOSIS — E78.00 PURE HYPERCHOLESTEROLEMIA: ICD-10-CM

## 2023-09-19 DIAGNOSIS — E11.51 TYPE 2 DIABETES MELLITUS WITH DIABETIC PERIPHERAL ANGIOPATHY WITHOUT GANGRENE, WITH LONG-TERM CURRENT USE OF INSULIN (HCC): ICD-10-CM

## 2023-09-19 DIAGNOSIS — Z12.5 SCREENING FOR PROSTATE CANCER: ICD-10-CM

## 2023-09-19 DIAGNOSIS — Z79.4 TYPE 2 DIABETES MELLITUS WITH DIABETIC PERIPHERAL ANGIOPATHY WITHOUT GANGRENE, WITH LONG-TERM CURRENT USE OF INSULIN (HCC): ICD-10-CM

## 2023-09-20 LAB
ALBUMIN SERPL-MCNC: 4 G/DL (ref 3.4–5)
ALBUMIN/GLOB SERPL: 2 {RATIO} (ref 1.1–2.2)
ALP SERPL-CCNC: 97 U/L (ref 40–129)
ALT SERPL-CCNC: 13 U/L (ref 10–40)
ANION GAP SERPL CALCULATED.3IONS-SCNC: 14 MMOL/L (ref 3–16)
AST SERPL-CCNC: 12 U/L (ref 15–37)
BASOPHILS # BLD: 0 K/UL (ref 0–0.2)
BASOPHILS NFR BLD: 0.4 %
BILIRUB SERPL-MCNC: 1 MG/DL (ref 0–1)
BUN SERPL-MCNC: 18 MG/DL (ref 7–20)
CALCIUM SERPL-MCNC: 8.7 MG/DL (ref 8.3–10.6)
CHLORIDE SERPL-SCNC: 104 MMOL/L (ref 99–110)
CHOLEST SERPL-MCNC: 193 MG/DL (ref 0–199)
CO2 SERPL-SCNC: 22 MMOL/L (ref 21–32)
CREAT SERPL-MCNC: 0.9 MG/DL (ref 0.8–1.3)
CREAT UR-MCNC: 129.9 MG/DL (ref 39–259)
DEPRECATED RDW RBC AUTO: 14.6 % (ref 12.4–15.4)
EOSINOPHIL # BLD: 0 K/UL (ref 0–0.6)
EOSINOPHIL NFR BLD: 0.8 %
EST. AVERAGE GLUCOSE BLD GHB EST-MCNC: 306.3 MG/DL
GFR SERPLBLD CREATININE-BSD FMLA CKD-EPI: >60 ML/MIN/{1.73_M2}
GLUCOSE P FAST SERPL-MCNC: 297 MG/DL (ref 70–99)
HBA1C MFR BLD: 12.3 %
HCT VFR BLD AUTO: 41.2 % (ref 40.5–52.5)
HDLC SERPL-MCNC: 42 MG/DL (ref 40–60)
HGB BLD-MCNC: 13.7 G/DL (ref 13.5–17.5)
LDL CHOLESTEROL CALCULATED: 123 MG/DL
LYMPHOCYTES # BLD: 1.9 K/UL (ref 1–5.1)
LYMPHOCYTES NFR BLD: 40.1 %
MCH RBC QN AUTO: 28.3 PG (ref 26–34)
MCHC RBC AUTO-ENTMCNC: 33.1 G/DL (ref 31–36)
MCV RBC AUTO: 85.4 FL (ref 80–100)
MICROALBUMIN UR DL<=1MG/L-MCNC: 167.7 MG/DL
MICROALBUMIN/CREAT UR: 1291 MG/G (ref 0–30)
MONOCYTES # BLD: 0.2 K/UL (ref 0–1.3)
MONOCYTES NFR BLD: 5 %
NEUTROPHILS # BLD: 2.6 K/UL (ref 1.7–7.7)
NEUTROPHILS NFR BLD: 53.7 %
PLATELET # BLD AUTO: 121 K/UL (ref 135–450)
PMV BLD AUTO: 11.7 FL (ref 5–10.5)
POTASSIUM SERPL-SCNC: 4.3 MMOL/L (ref 3.5–5.1)
PROT SERPL-MCNC: 6 G/DL (ref 6.4–8.2)
PSA SERPL DL<=0.01 NG/ML-MCNC: 0.49 NG/ML (ref 0–4)
RBC # BLD AUTO: 4.83 M/UL (ref 4.2–5.9)
SODIUM SERPL-SCNC: 140 MMOL/L (ref 136–145)
TRIGL SERPL-MCNC: 141 MG/DL (ref 0–150)
TSH SERPL DL<=0.005 MIU/L-ACNC: 1.74 UIU/ML (ref 0.27–4.2)
VLDLC SERPL CALC-MCNC: 28 MG/DL
WBC # BLD AUTO: 4.8 K/UL (ref 4–11)

## 2023-09-20 NOTE — RESULT ENCOUNTER NOTE
Please let pt know I have reviewed labs and his sugars are severely uncontrolled. We had tried reaching out to him about getting the meds we sent in. Get a visit scheduled please.

## 2023-10-04 ENCOUNTER — OFFICE VISIT (OUTPATIENT)
Dept: PRIMARY CARE CLINIC | Age: 68
End: 2023-10-04
Payer: MEDICARE

## 2023-10-04 VITALS
BODY MASS INDEX: 30.81 KG/M2 | OXYGEN SATURATION: 97 % | HEART RATE: 92 BPM | TEMPERATURE: 98.8 F | WEIGHT: 224 LBS | DIASTOLIC BLOOD PRESSURE: 69 MMHG | SYSTOLIC BLOOD PRESSURE: 132 MMHG

## 2023-10-04 DIAGNOSIS — E78.00 PURE HYPERCHOLESTEROLEMIA: ICD-10-CM

## 2023-10-04 DIAGNOSIS — Z79.4 TYPE 2 DIABETES MELLITUS WITH HYPERGLYCEMIA, WITH LONG-TERM CURRENT USE OF INSULIN (HCC): Primary | ICD-10-CM

## 2023-10-04 DIAGNOSIS — E11.65 TYPE 2 DIABETES MELLITUS WITH HYPERGLYCEMIA, WITH LONG-TERM CURRENT USE OF INSULIN (HCC): Primary | ICD-10-CM

## 2023-10-04 DIAGNOSIS — I10 ESSENTIAL HYPERTENSION: ICD-10-CM

## 2023-10-04 PROCEDURE — 99214 OFFICE O/P EST MOD 30 MIN: CPT | Performed by: FAMILY MEDICINE

## 2023-10-04 PROCEDURE — 2022F DILAT RTA XM EVC RTNOPTHY: CPT | Performed by: FAMILY MEDICINE

## 2023-10-04 PROCEDURE — 3017F COLORECTAL CA SCREEN DOC REV: CPT | Performed by: FAMILY MEDICINE

## 2023-10-04 PROCEDURE — G8484 FLU IMMUNIZE NO ADMIN: HCPCS | Performed by: FAMILY MEDICINE

## 2023-10-04 PROCEDURE — 1036F TOBACCO NON-USER: CPT | Performed by: FAMILY MEDICINE

## 2023-10-04 PROCEDURE — 3078F DIAST BP <80 MM HG: CPT | Performed by: FAMILY MEDICINE

## 2023-10-04 PROCEDURE — 3046F HEMOGLOBIN A1C LEVEL >9.0%: CPT | Performed by: FAMILY MEDICINE

## 2023-10-04 PROCEDURE — 1123F ACP DISCUSS/DSCN MKR DOCD: CPT | Performed by: FAMILY MEDICINE

## 2023-10-04 PROCEDURE — G8417 CALC BMI ABV UP PARAM F/U: HCPCS | Performed by: FAMILY MEDICINE

## 2023-10-04 PROCEDURE — 3074F SYST BP LT 130 MM HG: CPT | Performed by: FAMILY MEDICINE

## 2023-10-04 PROCEDURE — G8427 DOCREV CUR MEDS BY ELIG CLIN: HCPCS | Performed by: FAMILY MEDICINE

## 2023-10-04 RX ORDER — GLIPIZIDE 10 MG/1
10 TABLET ORAL DAILY
Qty: 60 TABLET | Refills: 5 | Status: SHIPPED | OUTPATIENT
Start: 2023-10-04

## 2023-10-04 RX ORDER — METFORMIN HYDROCHLORIDE 500 MG/1
1000 TABLET, EXTENDED RELEASE ORAL
Qty: 60 TABLET | Refills: 5 | Status: SHIPPED | OUTPATIENT
Start: 2023-10-04

## 2023-10-04 RX ORDER — INSULIN LISPRO 100 [IU]/ML
10 INJECTION, SOLUTION INTRAVENOUS; SUBCUTANEOUS
Qty: 12 ADJUSTABLE DOSE PRE-FILLED PEN SYRINGE | Refills: 3
Start: 2023-10-04 | End: 2023-10-04 | Stop reason: HOSPADM

## 2023-10-04 RX ORDER — INSULIN DEGLUDEC 200 U/ML
30 INJECTION, SOLUTION SUBCUTANEOUS NIGHTLY
Qty: 4 ADJUSTABLE DOSE PRE-FILLED PEN SYRINGE | Refills: 2
Start: 2023-10-04

## 2023-10-04 SDOH — ECONOMIC STABILITY: INCOME INSECURITY: HOW HARD IS IT FOR YOU TO PAY FOR THE VERY BASICS LIKE FOOD, HOUSING, MEDICAL CARE, AND HEATING?: NOT HARD AT ALL

## 2023-10-04 SDOH — ECONOMIC STABILITY: HOUSING INSECURITY
IN THE LAST 12 MONTHS, WAS THERE A TIME WHEN YOU DID NOT HAVE A STEADY PLACE TO SLEEP OR SLEPT IN A SHELTER (INCLUDING NOW)?: NO

## 2023-10-04 SDOH — ECONOMIC STABILITY: FOOD INSECURITY: WITHIN THE PAST 12 MONTHS, THE FOOD YOU BOUGHT JUST DIDN'T LAST AND YOU DIDN'T HAVE MONEY TO GET MORE.: NEVER TRUE

## 2023-10-04 SDOH — ECONOMIC STABILITY: FOOD INSECURITY: WITHIN THE PAST 12 MONTHS, YOU WORRIED THAT YOUR FOOD WOULD RUN OUT BEFORE YOU GOT MONEY TO BUY MORE.: NEVER TRUE

## 2023-10-04 NOTE — PROGRESS NOTES
mellitus with hyperglycemia, with long-term current use of insulin (HCC)  Chronic, severely uncontrolled. His sugars have essentially gone untreated as he admits to not taking meds that he had reported. At this time we will change his regimen in hopes of better compliance. I spent significant time with pt today to explain what it will take to get sugars under control. Will f/u short term to ensure compliance. - Insulin Degludec (TRESIBA FLEXTOUCH) 200 UNIT/ML SOPN; Inject 30 Units as directed at bedtime  Dispense: 4 Adjustable Dose Pre-filled Pen Syringe; Refill: 2  - metFORMIN (GLUCOPHAGE-XR) 500 MG extended release tablet; Take 2 tablets by mouth daily (with breakfast)  Dispense: 60 tablet; Refill: 5  - glipiZIDE (GLUCOTROL) 10 MG tablet; Take 1 tablet by mouth daily  Dispense: 60 tablet; Refill: 5    2. Essential hypertension  Controlled today. Refills had been sent after last visit. 3. Pure hypercholesterolemia  The cholesterol levels are high. In order to lower overall risk in future of plaque buildup and heart disease/stroke, need to work on improving these levels. The key to avoiding cholesterol meds is diet and exercise. Total time spent: Over 30 minutes. This includes time spent with the patient during the visit as well as time spent before and after the visit reviewing the chart, reviewing past/present studies and documenting the encounter. Return if symptoms worsen or fail to improve. Job Sara, DO     Please note that this chart was generated using dragon dictation software. Although every effort was made to ensure the accuracy of this automated transcription, some errors in transcription may have occurred.

## 2023-10-04 NOTE — PATIENT INSTRUCTIONS
[address] [facility] Laboratory Locations - No appointment necessary. ? indicates the location is open Saturdays in addition to Monday through Friday. Call your preferred location for test preparation, business hours and other information you need. [employer] accepts [employer]. CENTRAL  EAST  [place]    ? [place] [id number] [facility]. [facility], [address]    Ph: [address] [facility] [place], [address]    Ph: [phone]   ? [address]    [place], [address] [place] [address]    Ph: [address], [address]    Ph: [phone] ? [place]   [address]   Ph: [phone]  ? [address] [facility], [address] [facility]    Ph: [place] [address] [facility], [address] [facility]   Ph: [phone]    NORTH    ? [place], [place] [address]    Ph: [phone]  [facility]   [address] [facility], [facility] [place], [place] [address]e   Ph: [facility]. [place], [address] [facility]: [address]    Ph: [address] [facility].  [facility] [place], [place] [address]    Ph: [phone]

## 2023-10-06 ASSESSMENT — ENCOUNTER SYMPTOMS
ABDOMINAL PAIN: 0
NAUSEA: 0
COUGH: 0
SHORTNESS OF BREATH: 0
SORE THROAT: 0

## 2023-10-31 DIAGNOSIS — Z79.4 TYPE 2 DIABETES MELLITUS WITH HYPERGLYCEMIA, WITH LONG-TERM CURRENT USE OF INSULIN (HCC): ICD-10-CM

## 2023-10-31 DIAGNOSIS — E11.65 TYPE 2 DIABETES MELLITUS WITH HYPERGLYCEMIA, WITH LONG-TERM CURRENT USE OF INSULIN (HCC): ICD-10-CM

## 2023-10-31 NOTE — TELEPHONE ENCOUNTER
[facility] asking for a 90-day supply.   LAST SEEN       NEXT APPOINTMENT       LAST FILL    10.4.23  11.6.23

## 2023-11-01 RX ORDER — METFORMIN HYDROCHLORIDE 500 MG/1
1000 TABLET, EXTENDED RELEASE ORAL
Qty: 180 TABLET | Refills: 1 | Status: SHIPPED | OUTPATIENT
Start: 2023-11-01

## 2023-11-06 ENCOUNTER — OFFICE VISIT (OUTPATIENT)
Dept: PRIMARY CARE CLINIC | Age: 68
End: 2023-11-06
Payer: MEDICARE

## 2023-11-06 VITALS
OXYGEN SATURATION: 98 % | HEART RATE: 102 BPM | TEMPERATURE: 99.1 F | BODY MASS INDEX: 32.04 KG/M2 | WEIGHT: 233 LBS | SYSTOLIC BLOOD PRESSURE: 124 MMHG | DIASTOLIC BLOOD PRESSURE: 66 MMHG

## 2023-11-06 DIAGNOSIS — M79.604 RIGHT LEG PAIN: ICD-10-CM

## 2023-11-06 DIAGNOSIS — E11.65 TYPE 2 DIABETES MELLITUS WITH HYPERGLYCEMIA, WITH LONG-TERM CURRENT USE OF INSULIN (HCC): Primary | ICD-10-CM
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
## 2023-11-06 DIAGNOSIS — I10 ESSENTIAL HYPERTENSION: ICD-10-CM

## 2023-11-06 DIAGNOSIS — Z79.4 TYPE 2 DIABETES MELLITUS WITH HYPERGLYCEMIA, WITH LONG-TERM CURRENT USE OF INSULIN (HCC): Primary | ICD-10-CM

## 2023-11-06 PROCEDURE — 99214 OFFICE O/P EST MOD 30 MIN: CPT | Performed by: FAMILY MEDICINE

## 2023-11-06 PROCEDURE — 1036F TOBACCO NON-USER: CPT | Performed by: FAMILY MEDICINE

## 2023-11-06 PROCEDURE — 1123F ACP DISCUSS/DSCN MKR DOCD: CPT | Performed by: FAMILY MEDICINE

## 2023-11-06 PROCEDURE — 3078F DIAST BP <80 MM HG: CPT | Performed by: FAMILY MEDICINE

## 2023-11-06 PROCEDURE — G8427 DOCREV CUR MEDS BY ELIG CLIN: HCPCS | Performed by: FAMILY MEDICINE

## 2023-11-06 PROCEDURE — G8484 FLU IMMUNIZE NO ADMIN: HCPCS | Performed by: FAMILY MEDICINE

## 2023-11-06 PROCEDURE — 3046F HEMOGLOBIN A1C LEVEL >9.0%: CPT | Performed by: FAMILY MEDICINE

## 2023-11-06 PROCEDURE — G8417 CALC BMI ABV UP PARAM F/U: HCPCS | Performed by: FAMILY MEDICINE

## 2023-11-06 PROCEDURE — 3074F SYST BP LT 130 MM HG: CPT | Performed by: FAMILY MEDICINE

## 2023-11-06 PROCEDURE — 2022F DILAT RTA XM EVC RTNOPTHY: CPT | Performed by: FAMILY MEDICINE

## 2023-11-06 PROCEDURE — 3017F COLORECTAL CA SCREEN DOC REV: CPT | Performed by: FAMILY MEDICINE

## 2023-11-06 ASSESSMENT — ENCOUNTER SYMPTOMS
SORE THROAT: 0
NAUSEA: 0
ABDOMINAL PAIN: 0
COUGH: 0
SHORTNESS OF BREATH: 0

## 2023-11-06 NOTE — PATIENT INSTRUCTIONS
[address] [facility] Laboratory Locations - No appointment necessary. ? indicates the location is open Saturdays in addition to Monday through Friday. Call your preferred location for test preparation, business hours and other information you need. [employer] accepts [employer]. [facility]    ? [place] [id number]as. [facility], [address]    Ph: [address] [facility], [address]    Ph: [phone]   ? [address]    [place], [address] [place] [address]    Ph: [address], [address]    Ph: [phone] ? [place]   [address]   Ph: [phone]  ? 707 [facility], [address] [facility]    Ph: [place] [address] [facility], [address] [facility]   Ph: [phone]    NORTH    ? [facility] [place], [place] [address]    Ph: [phone]  [facility]   [address] [facility], [address]   Ph: Allyssa Contilo, [phone]    [address] [facility]: [address]oss, [address] [place] [address]    Ph: 713 [facility].  [facility] [place], [place] [address]    Ph: [phone]

## 2023-11-06 NOTE — PROGRESS NOTES
[address] [facility]. PRIMARY CARE  [address] [facility] [address]  Dept: [phone]  Dept Fax: [phone]     11/6/2023      Brandi Spencer   1955     Chief Complaint   Patient presents with    Check-Up     1 month       HPI  Pt comes in today for short term f/u on DM. Reports taking meds much better, about 90% of doses now. Last week reports a mechanical fall. Having some R leg pain since then. Worse with prolonged rest. Tried some OTC pain meds. Overall, pain improving. BP Readings from Last 5 Encounters:   11/06/23 124/66   10/04/23 132/69   09/11/23 137/86   01/02/23 (!) 146/78         9/11/2023     1:16 PM   PHQ Scores   PHQ2 Score 0   PHQ9 Score 0     Interpretation of Total Score Depression Severity: 1-4 = Minimal depression, 5-9 = Mild depression, 10-14 = Moderate depression, 15-19 = Moderately severe depression, 20-27 = Severe depression     Prior to Visit Medications    Medication Sig Taking?  Authorizing Provider   metFORMIN (GLUCOPHAGE-XR) 500 MG extended release tablet Take 2 tablets by mouth daily (with breakfast) Yes Karson Goodrich, DO   Insulin Degludec (TRESIBA FLEXTOUCH) 200 UNIT/ML SOPN Inject 30 Units as directed at bedtime Yes Bello Perez DO   glipiZIDE (GLUCOTROL) 10 MG tablet Take 1 tablet by mouth daily Yes Bello Perez DO   NIFEdipine (ADALAT CC) 90 MG extended release tablet Take 1 tablet by mouth daily Yes Bello Perez DO   lisinopril (PRINIVIL;ZESTRIL) 40 MG tablet Take 1 tablet by mouth daily Yes Bello Perez DO   atorvastatin (LIPITOR) 80 MG tablet Take 1 tablet by mouth daily Yes Bello Perez DO   aspirin 81 MG EC tablet Take 1 tablet by mouth daily Yes Bello Perez DO       Past Medical History:   Diagnosis Date    Diabetes mellitus ([address])     History of colon polyps 9/13/2023    Hyperlipidemia     Hypertension     POAG (primary open-angle

## 2023-11-08 DIAGNOSIS — Z79.4 TYPE 2 DIABETES MELLITUS WITH DIABETIC PERIPHERAL ANGIOPATHY WITHOUT GANGRENE, WITH LONG-TERM CURRENT USE OF INSULIN (HCC): ICD-10-CM

## 2023-11-08 DIAGNOSIS — I10 ESSENTIAL HYPERTENSION: ICD-10-CM

## 2023-11-08 DIAGNOSIS — E11.51 TYPE 2 DIABETES MELLITUS WITH DIABETIC PERIPHERAL ANGIOPATHY WITHOUT GANGRENE, WITH LONG-TERM CURRENT USE OF INSULIN (HCC): ICD-10-CM

## 2023-11-09 RX ORDER — ASPIRIN 81 MG/1
81 TABLET ORAL DAILY
Qty: 90 TABLET | Refills: 3 | Status: SHIPPED | OUTPATIENT
Start: 2023-11-09

## 2023-12-16 LAB
ESTIMATED AVERAGE GLUCOSE: NORMAL
HBA1C MFR BLD: 8.8 %

## 2024-01-14 DIAGNOSIS — I10 ESSENTIAL HYPERTENSION: ICD-10-CM

## 2024-01-15 RX ORDER — NIFEDIPINE 90 MG/1
90 TABLET, FILM COATED, EXTENDED RELEASE ORAL DAILY
Qty: 90 TABLET | Refills: 1 | Status: SHIPPED | OUTPATIENT
Start: 2024-01-15

## 2024-01-15 NOTE — TELEPHONE ENCOUNTER
Medication:   Requested Prescriptions     Pending Prescriptions Disp Refills    NIFEdipine (ADALAT CC) 90 MG extended release tablet [Pharmacy Med Name: NIFEDIPINE ER 90 MG TABLET] 90 tablet      Sig: TAKE 1 TABLET BY MOUTH EVERY DAY     Last Filled:  1/4/2024    Last appt: 11/6/2023   Next appt: 3/6/2024

## 2024-01-30 ENCOUNTER — TELEPHONE (OUTPATIENT)
Dept: PRIMARY CARE CLINIC | Age: 69
End: 2024-01-30

## 2024-06-01 DIAGNOSIS — E78.00 PURE HYPERCHOLESTEROLEMIA: ICD-10-CM

## 2024-06-03 RX ORDER — ATORVASTATIN CALCIUM 80 MG/1
80 TABLET, FILM COATED ORAL DAILY
Qty: 90 TABLET | OUTPATIENT
Start: 2024-06-03